# Patient Record
Sex: FEMALE | Race: OTHER | Employment: FULL TIME | URBAN - METROPOLITAN AREA
[De-identification: names, ages, dates, MRNs, and addresses within clinical notes are randomized per-mention and may not be internally consistent; named-entity substitution may affect disease eponyms.]

---

## 2020-12-08 ENCOUNTER — HOSPITAL ENCOUNTER (OUTPATIENT)
Age: 25
Discharge: HOME | End: 2020-12-08
Attending: EMERGENCY MEDICINE
Payer: COMMERCIAL

## 2020-12-08 VITALS
TEMPERATURE: 98 F | DIASTOLIC BLOOD PRESSURE: 74 MMHG | HEIGHT: 63 IN | RESPIRATION RATE: 16 BRPM | BODY MASS INDEX: 19.31 KG/M2 | OXYGEN SATURATION: 100 % | WEIGHT: 109 LBS | HEART RATE: 94 BPM | SYSTOLIC BLOOD PRESSURE: 120 MMHG

## 2020-12-08 DIAGNOSIS — R10.13 DYSPEPSIA: Primary | ICD-10-CM

## 2020-12-08 PROCEDURE — 99214 OFFICE O/P EST MOD 30 MIN: CPT | Performed by: EMERGENCY MEDICINE

## 2020-12-08 RX ORDER — PANTOPRAZOLE SODIUM 40 MG/1
40 TABLET, DELAYED RELEASE ORAL DAILY
Qty: 30 TABLET | Refills: 0 | Status: SHIPPED | OUTPATIENT
Start: 2020-12-08 | End: 2021-01-07

## 2020-12-08 NOTE — ED PROVIDER NOTES
Patient Seen in: [facility]      History   Patient presents with:  Chest Pain    Stated Complaint: chest pain/pain after swallowing    HPI  Patient is noted for the last several days discomfort in the epigastric area and a burning sensation s well-developed. Comments: Well appearing   HENT:      Head: Normocephalic and atraumatic.       Right Ear: External ear normal.      Left Ear: External ear normal.   Eyes:      Conjunctiva/sclera: Conjunctivae normal.   Neck:      Musculoskeletal: Norm

## 2020-12-29 ENCOUNTER — LAB ENCOUNTER (OUTPATIENT)
Dept: LAB | Age: 25
End: 2020-12-29
Attending: DERMATOLOGY
Payer: COMMERCIAL

## 2020-12-29 DIAGNOSIS — Z79.899 LONG-TERM USE OF HIGH-RISK MEDICATION: ICD-10-CM

## 2020-12-29 LAB — B-HCG SERPL-ACNC: <1 MIU/ML

## 2020-12-29 PROCEDURE — 84702 CHORIONIC GONADOTROPIN TEST: CPT

## 2020-12-29 PROCEDURE — 36415 COLL VENOUS BLD VENIPUNCTURE: CPT

## 2020-12-29 NOTE — PROGRESS NOTES
Spoke with pt informing of results. Pt verbalized understanding with no further questions or concerns. Isotretinoin 20mg escribed and labs placed for next month. Confirm Patient Counseling is Complete.   The patient's Program Status is  Required to [facility]

## 2021-01-25 ENCOUNTER — LAB ENCOUNTER (OUTPATIENT)
Dept: LAB | Age: 26
End: 2021-01-25
Attending: OBSTETRICS & GYNECOLOGY
Payer: COMMERCIAL

## 2021-01-25 DIAGNOSIS — Z79.899 LONG-TERM USE OF HIGH-RISK MEDICATION: ICD-10-CM

## 2021-01-25 DIAGNOSIS — L70.0 ACNE VULGARIS: ICD-10-CM

## 2021-01-25 LAB
ALT SERPL-CCNC: 19 U/L
AST SERPL-CCNC: 15 U/L (ref 15–37)
B-HCG SERPL-ACNC: <1 MIU/ML
CHOLEST SMN-MCNC: 169 MG/DL (ref ?–200)
HDLC SERPL-MCNC: 65 MG/DL (ref 40–59)
LDLC SERPL CALC-MCNC: 95 MG/DL (ref ?–100)
NONHDLC SERPL-MCNC: 104 MG/DL (ref ?–130)
PATIENT FASTING Y/N/NP: YES
TRIGL SERPL-MCNC: 44 MG/DL (ref 30–149)
VLDLC SERPL CALC-MCNC: 9 MG/DL (ref 0–30)

## 2021-01-25 PROCEDURE — 84702 CHORIONIC GONADOTROPIN TEST: CPT

## 2021-01-25 PROCEDURE — 84450 TRANSFERASE (AST) (SGOT): CPT

## 2021-01-25 PROCEDURE — 80061 LIPID PANEL: CPT

## 2021-01-25 PROCEDURE — 36415 COLL VENOUS BLD VENIPUNCTURE: CPT

## 2021-01-25 PROCEDURE — 84460 ALANINE AMINO (ALT) (SGPT): CPT

## 2021-01-25 NOTE — PROGRESS NOTES
Spoke with pt informing of results. Pt verbalized understanding. Appointment scheduled below.     Future Appointments  1/27/2021  1:15 PM    Winn Filter, DO [facility]

## 2021-03-01 ENCOUNTER — LAB ENCOUNTER (OUTPATIENT)
Dept: LAB | Age: 26
End: 2021-03-01
Attending: DERMATOLOGY
Payer: COMMERCIAL

## 2021-03-01 DIAGNOSIS — L70.0 ACNE VULGARIS: ICD-10-CM

## 2021-03-01 DIAGNOSIS — Z79.899 LONG-TERM USE OF HIGH-RISK MEDICATION: ICD-10-CM

## 2021-03-01 LAB
ALT SERPL-CCNC: 19 U/L
AST SERPL-CCNC: 15 U/L (ref 15–37)
B-HCG SERPL-ACNC: <1 MIU/ML
CHOLEST SMN-MCNC: 180 MG/DL (ref ?–200)
HDLC SERPL-MCNC: 62 MG/DL (ref 40–59)
LDLC SERPL CALC-MCNC: 108 MG/DL (ref ?–100)
NONHDLC SERPL-MCNC: 118 MG/DL (ref ?–130)
PATIENT FASTING Y/N/NP: YES
TRIGL SERPL-MCNC: 50 MG/DL (ref 30–149)
VLDLC SERPL CALC-MCNC: 10 MG/DL (ref 0–30)

## 2021-03-01 PROCEDURE — 84460 ALANINE AMINO (ALT) (SGPT): CPT

## 2021-03-01 PROCEDURE — 84450 TRANSFERASE (AST) (SGOT): CPT

## 2021-03-01 PROCEDURE — 80061 LIPID PANEL: CPT

## 2021-03-01 PROCEDURE — 36415 COLL VENOUS BLD VENIPUNCTURE: CPT

## 2021-03-01 PROCEDURE — 84702 CHORIONIC GONADOTROPIN TEST: CPT

## 2021-03-02 NOTE — PROGRESS NOTES
Spoke with pt informing of results. Pt verbalized understanding with no further questions or concerns.      Future Appointments  3/3/2021   3:45 PM    Jackolyn Legacy, DO [facility]  4/1/2021   10:00 AM   DO AGAPITO Delgado Statement Selected

## 2021-03-31 ENCOUNTER — LAB ENCOUNTER (OUTPATIENT)
Dept: LAB | Age: 26
End: 2021-03-31
Attending: DERMATOLOGY
Payer: COMMERCIAL

## 2021-03-31 DIAGNOSIS — Z79.899 LONG-TERM USE OF HIGH-RISK MEDICATION: ICD-10-CM

## 2021-03-31 DIAGNOSIS — L70.0 ACNE VULGARIS: ICD-10-CM

## 2021-03-31 PROCEDURE — 80061 LIPID PANEL: CPT

## 2021-03-31 PROCEDURE — 84702 CHORIONIC GONADOTROPIN TEST: CPT

## 2021-03-31 PROCEDURE — 84460 ALANINE AMINO (ALT) (SGPT): CPT

## 2021-03-31 PROCEDURE — 84450 TRANSFERASE (AST) (SGOT): CPT

## 2021-03-31 PROCEDURE — 36415 COLL VENOUS BLD VENIPUNCTURE: CPT

## 2021-05-03 ENCOUNTER — LAB ENCOUNTER (OUTPATIENT)
Dept: LAB | Age: 26
End: 2021-05-03
Attending: COUNSELOR
Payer: COMMERCIAL

## 2021-05-03 DIAGNOSIS — L70.0 ACNE VULGARIS: ICD-10-CM

## 2021-05-03 DIAGNOSIS — Z79.899 LONG-TERM USE OF HIGH-RISK MEDICATION: ICD-10-CM

## 2021-05-03 PROCEDURE — 84702 CHORIONIC GONADOTROPIN TEST: CPT

## 2021-05-03 PROCEDURE — 36415 COLL VENOUS BLD VENIPUNCTURE: CPT

## 2021-05-04 NOTE — PROGRESS NOTES
Spoke with pt informing of results. Pt verbalized understanding with no further questions or concerns. Confirm Patient Counseling is Complete. The patient's Program Status is  Required to Demonstrate Comprehension.    The patient must answer her Compre

## 2021-06-01 ENCOUNTER — LAB ENCOUNTER (OUTPATIENT)
Dept: LAB | Age: 26
End: 2021-06-01
Attending: DERMATOLOGY
Payer: COMMERCIAL

## 2021-06-01 DIAGNOSIS — Z79.899 LONG-TERM USE OF HIGH-RISK MEDICATION: ICD-10-CM

## 2021-06-01 DIAGNOSIS — L70.0 ACNE VULGARIS: ICD-10-CM

## 2021-06-01 PROCEDURE — 36415 COLL VENOUS BLD VENIPUNCTURE: CPT

## 2021-06-01 PROCEDURE — 84702 CHORIONIC GONADOTROPIN TEST: CPT

## 2021-07-22 ENCOUNTER — LAB ENCOUNTER (OUTPATIENT)
Dept: LAB | Age: 26
End: 2021-07-22
Attending: DERMATOLOGY
Payer: COMMERCIAL

## 2021-07-22 DIAGNOSIS — L70.0 ACNE VULGARIS: ICD-10-CM

## 2021-07-22 DIAGNOSIS — Z79.899 LONG-TERM USE OF HIGH-RISK MEDICATION: ICD-10-CM

## 2021-07-22 LAB — B-HCG SERPL-ACNC: <1 MIU/ML

## 2021-07-22 PROCEDURE — 36415 COLL VENOUS BLD VENIPUNCTURE: CPT

## 2021-07-22 PROCEDURE — 84702 CHORIONIC GONADOTROPIN TEST: CPT

## 2021-07-22 NOTE — PROGRESS NOTES
Future Appointments  7/23/2021  10:15 AM   Velora Page, DO [facility]    Confirm Patient Counseling is Complete. The patient's Program Status is  Required to Demonstrate Comprehension.    The patient must answer her Comprehe

## 2021-08-02 ENCOUNTER — LAB ENCOUNTER (OUTPATIENT)
Dept: LAB | Age: 26
End: 2021-08-02
Attending: DERMATOLOGY
Payer: COMMERCIAL

## 2021-08-02 DIAGNOSIS — Z79.899 LONG-TERM USE OF HIGH-RISK MEDICATION: ICD-10-CM

## 2021-08-02 DIAGNOSIS — L70.0 ACNE VULGARIS: ICD-10-CM

## 2021-08-02 LAB — B-HCG SERPL-ACNC: <1 MIU/ML

## 2021-08-02 PROCEDURE — 36415 COLL VENOUS BLD VENIPUNCTURE: CPT

## 2021-08-02 PROCEDURE — 84702 CHORIONIC GONADOTROPIN TEST: CPT

## 2021-08-02 NOTE — PROGRESS NOTES
Spoke with pt informing of hcg test. Pt verbalized understanding with no further questions or concerns. Updated in ipledge and 20 mg of isotretinoin sent to pharmacy. Pt had no questions at this time.     The patient's Program Status is  Required to [facility]

## 2021-10-19 PROBLEM — G56.01 CARPAL TUNNEL SYNDROME OF RIGHT WRIST: Status: ACTIVE | Noted: 2021-10-19

## 2021-10-19 PROBLEM — R63.4 WEIGHT LOSS: Status: ACTIVE | Noted: 2021-10-19

## 2022-12-12 ENCOUNTER — APPOINTMENT (OUTPATIENT)
Dept: OCCUPATIONAL MEDICINE | Age: 27
End: 2022-12-12
Attending: FAMILY MEDICINE

## 2023-04-11 ENCOUNTER — OFFICE VISIT (OUTPATIENT)
Dept: FAMILY MEDICINE CLINIC | Facility: CLINIC | Age: 28
End: 2023-04-11

## 2023-04-11 VITALS
TEMPERATURE: 98 F | BODY MASS INDEX: 20.66 KG/M2 | SYSTOLIC BLOOD PRESSURE: 106 MMHG | OXYGEN SATURATION: 96 % | RESPIRATION RATE: 18 BRPM | HEIGHT: 63 IN | HEART RATE: 84 BPM | WEIGHT: 116.63 LBS | DIASTOLIC BLOOD PRESSURE: 60 MMHG

## 2023-04-11 DIAGNOSIS — Z02.9 ADMINISTRATIVE ENCOUNTER: Primary | ICD-10-CM

## 2023-04-11 NOTE — PROGRESS NOTES
Needs physical forms filled out by a physician. Given [employer] website to find a physician.  Pt attempted to call her work site for clarification about physician seeing her vs an DAMON

## 2023-11-17 ENCOUNTER — TELEPHONE (OUTPATIENT)
Dept: FAMILY MEDICINE | Age: 28
End: 2023-11-17

## 2023-12-13 ENCOUNTER — APPOINTMENT (OUTPATIENT)
Dept: FAMILY MEDICINE | Age: 28
End: 2023-12-13

## 2024-02-10 ENCOUNTER — OFFICE VISIT (OUTPATIENT)
Dept: FAMILY MEDICINE CLINIC | Facility: CLINIC | Age: 29
End: 2024-02-10

## 2024-02-10 VITALS
DIASTOLIC BLOOD PRESSURE: 66 MMHG | TEMPERATURE: 98 F | BODY MASS INDEX: 21.09 KG/M2 | HEART RATE: 81 BPM | HEIGHT: 63 IN | OXYGEN SATURATION: 98 % | WEIGHT: 119 LBS | RESPIRATION RATE: 16 BRPM | SYSTOLIC BLOOD PRESSURE: 100 MMHG

## 2024-02-10 DIAGNOSIS — J06.9 VIRAL URI WITH COUGH: Primary | ICD-10-CM

## 2024-02-10 LAB
OPERATOR ID: NORMAL
RAPID SARS-COV-2 BY PCR: NOT DETECTED

## 2024-02-10 PROCEDURE — 99213 OFFICE O/P EST LOW 20 MIN: CPT | Performed by: NURSE PRACTITIONER

## 2024-02-10 PROCEDURE — U0002 COVID-19 LAB TEST NON-CDC: HCPCS | Performed by: NURSE PRACTITIONER

## 2024-02-10 RX ORDER — BENZONATATE 200 MG/1
CAPSULE ORAL
Qty: 20 CAPSULE | Refills: 0 | Status: SHIPPED | OUTPATIENT
Start: 2024-02-10

## 2024-02-10 NOTE — PROGRESS NOTES
CHIEF COMPLAINT:     Chief Complaint   Patient presents with    Sore Throat     Sx 3 days - ST, dry and productive cough, headaches  Denies fever, nasal pressure, body aches, chills, chest pressure, SOB  No Covid test was done  OTC DayQuil, NyQuil, cough medicine       HPI:   Darrell Adams is a 28 year old female who presents for upper respiratory symptoms for  3 days. Patient reports sore throat, dry and productive cough, headaches, rhinorrhea, coughing through night.  Symptoms have been worsening since onset.  Treating symptoms with: Nyquil, dayquil.  Denies fever, body aches, chills, dyspnea, wheezing, SOB, chest pain, N/V/D, ear pain, abd pain, or rash.  Denies history of asthma, smoking, pneumonia, or COPD.  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  Denies recent travel.    Current Outpatient Medications   Medication Sig Dispense Refill             No past medical history on file.   No past surgical history on file.      Social History     Socioeconomic History    Marital status: Single   Tobacco Use    Smoking status: Never    Smokeless tobacco: Never   Vaping Use    Vaping Use: Some days    Substances: Flavoring    Devices: Disposable   Substance and Sexual Activity    Alcohol use: Yes     Alcohol/week: 0.0 standard drinks of alcohol    Drug use: Never    Sexual activity: Not Currently         REVIEW OF SYSTEMS:   GENERAL: feels well otherwise, normal appetite  SKIN: no rashes or abnormal skin lesions  HEENT: See HPI  LUNGS: denies shortness of breath or wheezing, See HPI  CARDIOVASCULAR: denies chest pain or palpitations   GI: denies N/V/C or abdominal pain  NEURO: Denies severe HA or dizziness    EXAM:   /66   Pulse 81   Temp 98.1 °F (36.7 °C)   Resp 16   Ht 5' 3\" (1.6 m)   Wt 119 lb (54 kg)   LMP 01/25/2024 (Exact Date)   SpO2 98%   BMI 21.08 kg/m²   GENERAL: well developed, well nourished, and in no apparent distress  SKIN: no rashes, no suspicious  lesions  HEAD: atraumatic, normocephalic.    EYES: conjunctiva clear, EOM intact  EARS: TM's normal, no bulging, no retraction,no fluid, bony landmarks visible  NOSE: Nostrils patent, + clear nasal discharge, nasal mucosa pink and non inflamed  THROAT: Oral mucosa pink, moist. Posterior pharynx is +minimally erythematous. No exudates.   NECK: Supple, non-tender  LUNGS: clear to auscultation bilaterally, no wheezes or rhonchi. Breathing is non labored. +Dry cough.  CARDIO: RRR without murmur.  LYMPH: No lymphadenopathy.        ASSESSMENT AND PLAN:   Darrell Adams is a 28 year old female who presents with     ASSESSMENT:   Encounter Diagnosis   Name Primary?    Viral URI with cough Yes       PLAN:   - Negative rapid covid.  - Meds as below.  - Advised OTC/comfort care as described in Patient Instructions  - Advised F/U visit if no improvement/worsening within 1 week; sooner if new fever or worsening breathing.  To ER if ever dyspnea, chest pain, SOB, dehydration.  - Pt verbalizes understanding and is agreeable w/ plan.    Meds & Refills for this Visit:  Requested Prescriptions     Signed Prescriptions Disp Refills    benzonatate 200 MG Oral Cap 20 capsule 0     Sig: Take 1 capsule PO every 8 hours PRN cough       Risks, benefits, and side effects of medication explained and discussed.  Pt verbalizes understanding.    There are no Patient Instructions on file for this visit.

## 2024-06-06 ENCOUNTER — HOSPITAL ENCOUNTER (EMERGENCY)
Facility: HOSPITAL | Age: 29
Discharge: HOME | End: 2024-06-06
Attending: EMERGENCY MEDICINE
Payer: COMMERCIAL

## 2024-06-06 ENCOUNTER — APPOINTMENT (OUTPATIENT)
Dept: GENERAL RADIOLOGY | Facility: HOSPITAL | Age: 29
End: 2024-06-06
Attending: EMERGENCY MEDICINE
Payer: COMMERCIAL

## 2024-06-06 VITALS
HEIGHT: 63 IN | RESPIRATION RATE: 14 BRPM | TEMPERATURE: 98 F | SYSTOLIC BLOOD PRESSURE: 114 MMHG | OXYGEN SATURATION: 99 % | HEART RATE: 72 BPM | DIASTOLIC BLOOD PRESSURE: 70 MMHG | BODY MASS INDEX: 21.26 KG/M2 | WEIGHT: 120 LBS

## 2024-06-06 DIAGNOSIS — J10.1 INFLUENZA A: Primary | ICD-10-CM

## 2024-06-06 LAB
FLUAV + FLUBV RNA SPEC NAA+PROBE: NEGATIVE
FLUAV + FLUBV RNA SPEC NAA+PROBE: POSITIVE
RSV RNA SPEC NAA+PROBE: NEGATIVE
SARS-COV-2 RNA RESP QL NAA+PROBE: NOT DETECTED

## 2024-06-06 PROCEDURE — 99284 EMERGENCY DEPT VISIT MOD MDM: CPT

## 2024-06-06 PROCEDURE — 0241U SARS-COV-2/FLU A AND B/RSV BY PCR (GENEXPERT): CPT | Performed by: EMERGENCY MEDICINE

## 2024-06-06 PROCEDURE — 94640 AIRWAY INHALATION TREATMENT: CPT

## 2024-06-06 PROCEDURE — 71045 X-RAY EXAM CHEST 1 VIEW: CPT | Performed by: EMERGENCY MEDICINE

## 2024-06-06 RX ORDER — IPRATROPIUM BROMIDE AND ALBUTEROL SULFATE 2.5; .5 MG/3ML; MG/3ML
3 SOLUTION RESPIRATORY (INHALATION) ONCE
Status: COMPLETED | OUTPATIENT
Start: 2024-06-06 | End: 2024-06-06

## 2024-06-06 RX ORDER — PREDNISONE 20 MG/1
40 TABLET ORAL DAILY
Qty: 8 TABLET | Refills: 0 | Status: SHIPPED | OUTPATIENT
Start: 2024-06-06 | End: 2024-06-10

## 2024-06-06 RX ORDER — AZITHROMYCIN 250 MG/1
250 TABLET, FILM COATED ORAL DAILY
COMMUNITY

## 2024-06-06 RX ORDER — ALBUTEROL SULFATE 2.5 MG/3ML
SOLUTION RESPIRATORY (INHALATION) EVERY 6 HOURS PRN
COMMUNITY

## 2024-06-06 RX ORDER — ALBUTEROL SULFATE 90 UG/1
2 AEROSOL, METERED RESPIRATORY (INHALATION) EVERY 4 HOURS PRN
Qty: 1 EACH | Refills: 0 | Status: SHIPPED | OUTPATIENT
Start: 2024-06-06 | End: 2024-07-06

## 2024-06-07 NOTE — ED INITIAL ASSESSMENT (HPI)
C/O chest congestion associated with a cough.  States she had a fever Monday. No N/V. Came back from [facility] 2 days ago

## 2024-06-07 NOTE — ED PROVIDER NOTES
Patient Seen in: [facility] Emergency Department      History   No chief complaint on file.    Stated Complaint: congestion    Subjective:   HPI    Patient presents emergency department complaining of cough, congestion for the last 5 days.  She states that she was vacationing in [place] when she became sick with a harsh cough.  She saw a medical provider there who prescribed albuterol and antibiotics.  She states she feels no better.  She also states that no testing or imaging.  She wanted to make sure everything was okay.  There is no fever vomiting or diarrhea.    Objective:   No pertinent past medical history.            History reviewed. No pertinent surgical history.             No pertinent social history.            Review of Systems    Positive for stated complaint: congestion  Other systems are as noted in HPI.  Constitutional and vital signs reviewed.      All other systems reviewed and negative except as noted above.    Physical Exam     ED Triage Vitals   BP 06/06/24 2044 101/68   Pulse 06/06/24 2044 80   Resp 06/06/24 2044 16   Temp 06/06/24 2044 98 °F (36.7 °C)   Temp src --    SpO2 06/06/24 2130 95 %   O2 Device --        Current Vitals:   Vital Signs  BP: 101/68  Pulse: 80  Resp: 16  Temp: 98 °F (36.7 °C)    Oxygen Therapy  SpO2: 95 %            Physical Exam  Vitals and nursing note reviewed.   Constitutional:       General: She is not in acute distress.     Appearance: She is well-developed.   HENT:      Head: Normocephalic.      Right Ear: Tympanic membrane and ear canal normal.      Left Ear: Tympanic membrane and ear canal normal.      Nose: Nose normal.      Mouth/Throat:      Mouth: Mucous membranes are moist.   Eyes:      Conjunctiva/sclera: Conjunctivae normal.   Cardiovascular:      Rate and Rhythm: Normal rate and regular rhythm.      Heart sounds: No murmur heard.  Pulmonary:      Effort: Pulmonary effort is normal. No respiratory distress.      Breath sounds: Wheezing  present.   Abdominal:      General: There is no distension.      Palpations: Abdomen is soft.      Tenderness: There is no abdominal tenderness.   Musculoskeletal:         General: No tenderness. Normal range of motion.      Cervical back: Normal range of motion and neck supple.   Skin:     General: Skin is warm and dry.      Findings: No rash.   Neurological:      General: No focal deficit present.      Mental Status: She is alert and oriented to person, place, and time.      Cranial Nerves: No cranial nerve deficit.      Sensory: No sensory deficit.      Motor: No weakness.      Coordination: Coordination normal.               ED Course     Labs Reviewed   SARS-COV-2/FLU A AND B/RSV BY PCR (GENEXPERT) - Abnormal; Notable for the following components:       Result Value    Influenza A by PCR Positive (*)     All other components within normal limits    Narrative:     This test is intended for the qualitative detection and differentiation of SARS-CoV-2, influenza A, influenza B, and respiratory syncytial virus (RSV) viral RNA in nasopharyngeal or nares swabs from individuals suspected of respiratory viral infection consistent with COVID-19 by their healthcare provider. Signs and symptoms of respiratory viral infection due to SARS-CoV-2, influenza, and RSV can be similar.    Test performed using the Xpert Xpress SARS-CoV-2/FLU/RSV (real time RT-PCR)  assay on the GeneXpert instrument, [employer], [place] [address].   This test is being used under the Food and Drug Administration's Emergency Use Authorization.    The authorized Fact Sheet for Healthcare Providers for this assay is available upon request from the laboratory.                      MDM                        Medical Decision Making  Differential diagnosis considered for pneumonia, viral illness, reflux.    Problems Addressed:  Influenza A: acute illness or injury    Amount and/or Complexity of Data Reviewed  Labs: ordered. Decision-making details  documented in ED Course.     Details: Viral swab positive for influenza A  Radiology: ordered and independent interpretation performed. Decision-making details documented in ED Course.     Details: Chest x-ray normal  Discussion of management or test interpretation with external provider(s): Albuterol and Atrovent nebulizer given here.  Will start prednisone.  Follow-up with primary care physician.  Outside the window for Tamiflu.    Risk  Prescription drug management.        Disposition and Plan     Clinical Impression:  1. Influenza A         Disposition:  Discharge  6/6/2024 10:03 pm    Follow-up:  Lincoln Montaño MD  [address] [place] [id number]  [phone]    Schedule an appointment as soon as possible for a visit            Medications Prescribed:  Current Discharge Medication List        START taking these medications    Details   albuterol 108 (90 Base) MCG/ACT Inhalation Aero Soln Inhale 2 puffs into the lungs every 4 (four) hours as needed.  Qty: 1 each, Refills: 0      predniSONE 20 MG Oral Tab Take 2 tablets (40 mg total) by mouth daily for 4 days.  Qty: 8 tablet, Refills: 0

## 2024-06-07 NOTE — ED QUICK NOTES
Patient presents to ED 25 from triage with c/o chest congestion and cough. Patient recently returned from [place] and was sick with flu-like symptoms on her trip. Went to see a doctor out there and was placed on multiple medication. Patient states most symptoms have resided but cough and congestion lingering. Patient is currently using a robitussin type medication purchased in [facility]. Patient is A&O x 4. No distress noted. Respirations regular and unlabored. Call light at reach.

## 2024-06-07 NOTE — ED QUICK NOTES
Patient A&O x 4. No distress noted. Respirations regular and unlabored. All questions answered, along with all ED orders completed.   Patient instructed to return back to ED with any new onset of severe symptoms and to follow up with PCP or recommended specialist as directed.

## 2025-02-04 ENCOUNTER — HOSPITAL ENCOUNTER (EMERGENCY)
Facility: HOSPITAL | Age: 30
Discharge: HOME | End: 2025-02-04
Attending: STUDENT IN AN ORGANIZED HEALTH CARE EDUCATION/TRAINING PROGRAM
Payer: COMMERCIAL

## 2025-02-04 VITALS
OXYGEN SATURATION: 100 % | TEMPERATURE: 99 F | RESPIRATION RATE: 15 BRPM | SYSTOLIC BLOOD PRESSURE: 108 MMHG | BODY MASS INDEX: 20.73 KG/M2 | WEIGHT: 117 LBS | HEART RATE: 69 BPM | DIASTOLIC BLOOD PRESSURE: 76 MMHG | HEIGHT: 63 IN

## 2025-02-04 DIAGNOSIS — L50.0 ALLERGIC URTICARIA: Primary | ICD-10-CM

## 2025-02-04 PROCEDURE — 99283 EMERGENCY DEPT VISIT LOW MDM: CPT

## 2025-02-04 RX ORDER — HYDROXYZINE HYDROCHLORIDE 25 MG/1
25 TABLET, FILM COATED ORAL ONCE
Status: COMPLETED | OUTPATIENT
Start: 2025-02-04 | End: 2025-02-04

## 2025-02-04 RX ORDER — HYDROXYZINE HYDROCHLORIDE 25 MG/1
TABLET, FILM COATED ORAL EVERY 4 HOURS PRN
Qty: 20 TABLET | Refills: 0 | Status: SHIPPED | OUTPATIENT
Start: 2025-02-04 | End: 2025-03-06

## 2025-02-04 RX ORDER — PREDNISONE 20 MG/1
60 TABLET ORAL ONCE
Status: COMPLETED | OUTPATIENT
Start: 2025-02-04 | End: 2025-02-04

## 2025-02-04 RX ORDER — PREDNISONE 50 MG/1
50 TABLET ORAL DAILY
Qty: 5 TABLET | Refills: 0 | Status: SHIPPED | OUTPATIENT
Start: 2025-02-04 | End: 2025-02-09

## 2025-02-04 RX ORDER — CETIRIZINE HYDROCHLORIDE 10 MG/1
10 TABLET ORAL DAILY
Qty: 30 TABLET | Refills: 0 | Status: SHIPPED | OUTPATIENT
Start: 2025-02-04 | End: 2025-03-06

## 2025-02-05 NOTE — ED INITIAL ASSESSMENT (HPI)
Patient states has swelling below bilateral eyes since January. Rash to left side of neck for 2 weeks. Denies pain or burning, only itching. States has appointment to see PCP on 2/20, however, itching and inflammation worsening even when taking Benadryl. Denies using any new products, no known allergies.

## 2025-02-05 NOTE — ED PROVIDER NOTES
[facility] Emergency Department Note  Patient: Darrell Adams Age: 29 year old Sex: female      MRN: [id number]  : 1995    Patient Seen in: [facility] Emergency Department    History     Chief Complaint   Patient presents with    Rash Skin Problem     Stated Complaint: Rash?    History obtained from: Patient    Patient is a 29-year-old female with no significant past medical history presenting today for evaluation of periorbital swelling over the past several weeks.  She states that she also has been having a pruritic rash for the left side of her neck for the past 2 weeks.  She states that she has been taking Benadryl on a daily basis with no improvement of symptoms.  She denies any new soaps, laundry detergents, cosmetics, or any new foods.  Denies any recent travel.  States that she has an appoint with her PCP in  with the symptoms but states that the itching and inflammation is worsened over the past days prompting evaluation in the emergency department.  She denies any nausea, vomiting, sore throat, throat swelling, lip swelling, or tongue swelling.    Review of Systems:  Review of Systems  Positive for stated complaint: Rash?. Constitutional and vital signs reviewed. All other systems reviewed and negative except as noted above.    Patient History:  History reviewed. No pertinent past medical history.    History reviewed. No pertinent surgical history.     Family History   Problem Relation Age of Onset    Hypertension Mother     Diabetes Father     Hypertension Father     Colon Cancer Maternal Grandmother        Specific Social Determinants of Health:   Social History     Socioeconomic History    Marital status: Single   Tobacco Use    Smoking status: Never    Smokeless tobacco: Never   Vaping Use    Vaping status: Some Days    Substances: Flavoring    Devices: Disposable   Substance and Sexual Activity    Alcohol use: Yes     Alcohol/week: 0.0 standard drinks of alcohol    Drug use: Never     Sexual activity: Not Currently     Social Drivers of Health     Food Insecurity: No Food Insecurity (11/8/2024)    Received from [facility]    Food Insecurity     Currently or in the past 3 months, have you worried your food would run out before you had money to buy more?: No     In the past 12 months, have you run out of food or been unable to get more?: No   Transportation Needs: No Transportation Needs (11/8/2024)    Received from [facility]    Transportation Needs     Currently or in the past 3 months, has lack of transportation kept you from medical appointments, getting food or medicine, or providing care to a family member?: Unrecognized value     Medical Transportation Needs?: No    Received from [facility]    Housing Stability           PSFH elements reviewed from today and agreed except as otherwise stated in HPI.    Physical Exam     ED Triage Vitals [02/04/25 2058]   /70   Pulse 92   Resp 17   Temp 98.6 °F (37 °C)   Temp src Oral   SpO2 98 %   O2 Device None (Room air)       Current:/76   Pulse 69   Temp 98.6 °F (37 °C) (Oral)   Resp 15   Ht 160 cm (5' 3\")   Wt 53.1 kg   LMP 01/21/2025 (Exact Date)   SpO2 100%   BMI 20.73 kg/m²         Physical Exam  Constitutional:       General: She is not in acute distress.  HENT:      Head: Normocephalic and atraumatic.      Mouth/Throat:      Mouth: Mucous membranes are moist.   Eyes:      Extraocular Movements: Extraocular movements intact.   Cardiovascular:      Rate and Rhythm: Normal rate and regular rhythm.      Heart sounds: Normal heart sounds.   Pulmonary:      Effort: Pulmonary effort is normal. No respiratory distress.      Breath sounds: Normal breath sounds.   Abdominal:      Palpations: Abdomen is soft.      Tenderness: There is no abdominal tenderness.   Skin:     General: Skin is warm and dry.      Capillary Refill: Capillary refill takes less than 2 seconds.       Findings: No rash.      Comments: Urticarial rash on the left side of the neck and periorbital region with mild soft tissue swelling of the inferior lids bilaterally   Neurological:      General: No focal deficit present.      Mental Status: She is alert and oriented to person, place, and time.   Psychiatric:         Mood and Affect: Mood normal.         Behavior: Behavior normal.         ED Course   Labs:   Labs Reviewed - No data to display  Radiology findings:  I personally reviewed the images.   No results found.        MDM   29-year-old female with no significant past medical history presenting today for evaluation of periorbital edema and urticarial rash on the left side of the neck over the past several weeks.  Upon arrival to the emergency department, patient's vitals are within normal limits.  She has urticarial rash over her face and left side of her neck.  No oral or tongue swelling.  No evidence of respiratory distress.    Differential diagnoses considered includes, but is not limited to: Urticaria, urticaria multiforme, dermatitis, eczema, considered glomerulonephropathy    Initial Medications/Therapeutics administered: Prednisone    Chronic conditions affecting care: None    Patient's physical examination is most consistent with allergic urticaria.  Will trial course of prednisone.  Discussed switching from prednisone trial to sertraline to avoid adverse side effect of drowsiness.  Discussed Atarax as needed for itching.  Return precautions were discussed and all questions answered.  Recommended PCP follow-up as scheduled for discussion of allergy testing.  Patient expressed understanding and agreement with plan.    Disposition and Plan     Clinical Impression:  1. Allergic urticaria        Disposition:  Discharge    Follow-up:  Your PCP    Schedule an appointment as soon as possible for a visit in 2 day(s)  As needed, If symptoms worsen    Aleksandr Oliva MD  [address] [facility] [address]  [place]  [place] [address]  [phone]    Schedule an appointment as soon as possible for a visit in 1 week(s)  As needed, If symptoms worsen      Medications Prescribed:  Discharge Medication List as of 2/4/2025  9:58 PM        START taking these medications    Details   predniSONE 50 MG Oral Tab Take 1 tablet (50 mg total) by mouth daily for 5 days., Normal, Disp-5 tablet, R-0      cetirizine 10 MG Oral Tab Take 1 tablet (10 mg total) by mouth daily., Normal, Disp-30 tablet, R-0      hydrOXYzine 25 MG Oral Tab Take 1-2 tablets (25-50 mg total) by mouth every 4 (four) hours as needed., Normal, Disp-20 tablet, R-0               This note may have been created using voice dictation technology and may include inadvertent errors.      Linn Hall MD  Emergency Medicine

## 2025-02-05 NOTE — DISCHARGE INSTRUCTIONS
Thank you for seeking care at [facility] Emergency Department.  You have been seen and evaluated for an allergic reaction.     We discussed the results of your workup   Please read the instructions provided   If given prescriptions, take as instructed    As discussed, the causes of an allergic reaction may not always be apparent. You need to follow-up with the primary care provider for repeat evaluations. Please start taking a diary of exposures to foods, detergents, soaps, any medications, any other tenses you come into contact with. You may need to be seen by an allergist for further testing.     Remember, your care process does not end after your visit today. Please follow-up with your doctor within 1-2 days for a follow-up check to ensure you are  improving, to see if you need any further evaluation/testing, or to evaluate for any alternate diagnoses.     Please return to the emergency department if you develop difficulty breathing, swelling, feeling your throat is closing or developing swelling in your face or mouth, lightheadedness, dizziness, severe vomiting, fainting, feeling very ill or if you develop any other new or concerning symptoms as these could be signs of more serious medical illness.